# Patient Record
Sex: FEMALE | Race: WHITE
[De-identification: names, ages, dates, MRNs, and addresses within clinical notes are randomized per-mention and may not be internally consistent; named-entity substitution may affect disease eponyms.]

---

## 2021-10-12 ENCOUNTER — HOSPITAL ENCOUNTER (EMERGENCY)
Dept: HOSPITAL 79 - ER1 | Age: 31
Discharge: HOME | End: 2021-10-12
Payer: COMMERCIAL

## 2021-10-12 DIAGNOSIS — Z90.49: ICD-10-CM

## 2021-10-12 DIAGNOSIS — Z88.0: ICD-10-CM

## 2021-10-12 DIAGNOSIS — N39.0: Primary | ICD-10-CM

## 2021-10-12 LAB
BUN/CREATININE RATIO: 14 (ref 0–10)
HGB BLD-MCNC: 15.3 GM/DL (ref 12.3–15.3)
RED BLOOD COUNT: 5.19 M/UL (ref 4–5.1)
WHITE BLOOD COUNT: 8.9 K/UL (ref 4.5–11)

## 2021-10-29 ENCOUNTER — HOSPITAL ENCOUNTER (EMERGENCY)
Dept: HOSPITAL 79 - ER1 | Age: 31
Discharge: LEFT BEFORE DISCHARGE | End: 2021-10-29
Payer: COMMERCIAL

## 2021-10-29 DIAGNOSIS — Z53.21: Primary | ICD-10-CM

## 2021-10-29 LAB
BUN/CREATININE RATIO: 22 (ref 0–10)
HGB BLD-MCNC: 14.4 GM/DL (ref 12.3–15.3)
RED BLOOD COUNT: 4.86 M/UL (ref 4–5.1)
WHITE BLOOD COUNT: 10.9 K/UL (ref 4.5–11)

## 2021-12-04 ENCOUNTER — HOSPITAL ENCOUNTER (EMERGENCY)
Dept: HOSPITAL 79 - ER1 | Age: 31
Discharge: HOME | End: 2021-12-04
Payer: COMMERCIAL

## 2021-12-04 DIAGNOSIS — I88.0: Primary | ICD-10-CM

## 2021-12-04 LAB
BUN/CREATININE RATIO: 13 (ref 0–10)
HGB BLD-MCNC: 14.1 GM/DL (ref 12.3–15.3)
RED BLOOD COUNT: 4.83 M/UL (ref 4–5.1)
WHITE BLOOD COUNT: 5.7 K/UL (ref 4.5–11)

## 2021-12-06 ENCOUNTER — HOSPITAL ENCOUNTER (EMERGENCY)
Dept: HOSPITAL 79 - ER1 | Age: 31
Discharge: HOME | End: 2021-12-06
Payer: COMMERCIAL

## 2021-12-06 DIAGNOSIS — B08.4: Primary | ICD-10-CM

## 2022-01-13 ENCOUNTER — HOSPITAL ENCOUNTER (EMERGENCY)
Dept: HOSPITAL 79 - ER1 | Age: 32
Discharge: HOME | End: 2022-01-13
Payer: COMMERCIAL

## 2022-01-13 DIAGNOSIS — R07.89: Primary | ICD-10-CM

## 2022-01-13 DIAGNOSIS — F17.200: ICD-10-CM

## 2022-01-13 DIAGNOSIS — Z88.0: ICD-10-CM

## 2022-01-13 DIAGNOSIS — Z88.8: ICD-10-CM

## 2022-01-13 LAB
BUN/CREATININE RATIO: 19 (ref 0–10)
HGB BLD-MCNC: 13 GM/DL (ref 12.3–15.3)
RED BLOOD COUNT: 4.55 M/UL (ref 4–5.1)
WHITE BLOOD COUNT: 7.1 K/UL (ref 4.5–11)

## 2022-01-23 ENCOUNTER — HOSPITAL ENCOUNTER (EMERGENCY)
Dept: HOSPITAL 79 - ER1 | Age: 32
Discharge: HOME | End: 2022-01-23
Payer: COMMERCIAL

## 2022-01-23 DIAGNOSIS — F17.200: ICD-10-CM

## 2022-01-23 DIAGNOSIS — U07.1: Primary | ICD-10-CM

## 2022-01-23 PROCEDURE — U0003 INFECTIOUS AGENT DETECTION BY NUCLEIC ACID (DNA OR RNA); SEVERE ACUTE RESPIRATORY SYNDROME CORONAVIRUS 2 (SARS-COV-2) (CORONAVIRUS DISEASE [COVID-19]), AMPLIFIED PROBE TECHNIQUE, MAKING USE OF HIGH THROUGHPUT TECHNOLOGIES AS DESCRIBED BY CMS-2020-01-R: HCPCS

## 2022-03-01 ENCOUNTER — HOSPITAL ENCOUNTER (EMERGENCY)
Dept: HOSPITAL 79 - ER1 | Age: 32
Discharge: HOME | End: 2022-03-01
Payer: COMMERCIAL

## 2022-03-01 DIAGNOSIS — F17.290: ICD-10-CM

## 2022-03-01 DIAGNOSIS — S20.212A: ICD-10-CM
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
## 2022-03-01 DIAGNOSIS — Z90.710: ICD-10-CM

## 2022-03-01 DIAGNOSIS — S90.32XA: Primary | ICD-10-CM

## 2022-03-08 ENCOUNTER — HOSPITAL ENCOUNTER (EMERGENCY)
Dept: HOSPITAL 79 - ER1 | Age: 32
Discharge: HOME | End: 2022-03-08
Payer: COMMERCIAL

## 2022-03-08 DIAGNOSIS — F17.210: ICD-10-CM

## 2022-03-08 DIAGNOSIS — Z90.710: ICD-10-CM

## 2022-03-08 DIAGNOSIS — J02.0: Primary | ICD-10-CM

## 2022-03-08 DIAGNOSIS — Z88.0: ICD-10-CM

## 2022-03-08 LAB
BUN/CREATININE RATIO: 18 (ref 0–10)
HGB BLD-MCNC: 13.9 GM/DL (ref 12.3–15.3)
RED BLOOD COUNT: 5 M/UL (ref 4–5.1)
WHITE BLOOD COUNT: 9.5 K/UL (ref 4.5–11)

## 2022-03-10 ENCOUNTER — HOSPITAL ENCOUNTER (EMERGENCY)
Dept: HOSPITAL 79 - ER1 | Age: 32
Discharge: HOME | End: 2022-03-10
Payer: COMMERCIAL

## 2022-03-10 DIAGNOSIS — R10.31: Primary | ICD-10-CM

## 2022-03-10 LAB
BUN/CREATININE RATIO: 17 (ref 0–10)
HGB BLD-MCNC: 12.9 GM/DL (ref 12.3–15.3)
RED BLOOD COUNT: 4.72 M/UL (ref 4–5.1)
WHITE BLOOD COUNT: 5.5 K/UL (ref 4.5–11)

## 2022-03-11 ENCOUNTER — APPOINTMENT (OUTPATIENT)
Dept: GENERAL RADIOLOGY | Facility: HOSPITAL | Age: 32
End: 2022-03-11

## 2022-03-11 ENCOUNTER — HOSPITAL ENCOUNTER (EMERGENCY)
Facility: HOSPITAL | Age: 32
Discharge: HOME | End: 2022-03-12
Attending: EMERGENCY MEDICINE | Admitting: EMERGENCY MEDICINE

## 2022-03-11 DIAGNOSIS — R11.10 VOMITING, INTRACTABILITY OF VOMITING NOT SPECIFIED, PRESENCE OF NAUSEA NOT SPECIFIED, UNSPECIFIED VOMITING TYPE: Primary | ICD-10-CM

## 2022-03-11 DIAGNOSIS — R10.84 GENERALIZED ABDOMINAL PAIN: ICD-10-CM

## 2022-03-11 DIAGNOSIS — M54.50 ACUTE LOW BACK PAIN WITHOUT SCIATICA, UNSPECIFIED BACK PAIN LATERALITY: ICD-10-CM

## 2022-03-11 LAB
ALBUMIN SERPL-MCNC: 4.04 G/DL (ref 3.5–5.2)
ALBUMIN/GLOB SERPL: 1.2 G/DL
ALP SERPL-CCNC: 120 U/L (ref 39–117)
ALT SERPL W P-5'-P-CCNC: 87 U/L (ref 1–33)
ANION GAP SERPL CALCULATED.3IONS-SCNC: 12.8 MMOL/L (ref 5–15)
AST SERPL-CCNC: 75 U/L (ref 1–32)
BASOPHILS # BLD AUTO: 0.03 10*3/MM3 (ref 0–0.2)
BASOPHILS NFR BLD AUTO: 0.8 % (ref 0–1.5)
BILIRUB SERPL-MCNC: 0.2 MG/DL (ref 0–1.2)
BILIRUB UR QL STRIP: NEGATIVE
BUN SERPL-MCNC: 11 MG/DL (ref 6–20)
BUN/CREAT SERPL: 16.9 (ref 7–25)
CALCIUM SPEC-SCNC: 9.5 MG/DL (ref 8.6–10.5)
CHLORIDE SERPL-SCNC: 103 MMOL/L (ref 98–107)
CLARITY UR: CLEAR
CO2 SERPL-SCNC: 21.2 MMOL/L (ref 22–29)
COLOR UR: YELLOW
CREAT SERPL-MCNC: 0.65 MG/DL (ref 0.57–1)
CRP SERPL-MCNC: 2.64 MG/DL (ref 0–0.5)
DEPRECATED RDW RBC AUTO: 45.1 FL (ref 37–54)
EGFRCR SERPLBLD CKD-EPI 2021: 121.6 ML/MIN/1.73
EOSINOPHIL # BLD AUTO: 0.11 10*3/MM3 (ref 0–0.4)
EOSINOPHIL NFR BLD AUTO: 2.8 % (ref 0.3–6.2)
ERYTHROCYTE [DISTWIDTH] IN BLOOD BY AUTOMATED COUNT: 13.9 % (ref 12.3–15.4)
ERYTHROCYTE [SEDIMENTATION RATE] IN BLOOD: 40 MM/HR (ref 0–20)
FLUAV RNA RESP QL NAA+PROBE: NOT DETECTED
FLUBV RNA RESP QL NAA+PROBE: NOT DETECTED
GLOBULIN UR ELPH-MCNC: 3.3 GM/DL
GLUCOSE SERPL-MCNC: 90 MG/DL (ref 65–99)
GLUCOSE UR STRIP-MCNC: NEGATIVE MG/DL
HCT VFR BLD AUTO: 42.9 % (ref 34–46.6)
HGB BLD-MCNC: 13.2 G/DL (ref 12–15.9)
HGB UR QL STRIP.AUTO: NEGATIVE
HOLD SPECIMEN: NORMAL
HOLD SPECIMEN: NORMAL
IMM GRANULOCYTES # BLD AUTO: 0.02 10*3/MM3 (ref 0–0.05)
IMM GRANULOCYTES NFR BLD AUTO: 0.5 % (ref 0–0.5)
KETONES UR QL STRIP: ABNORMAL
LEUKOCYTE ESTERASE UR QL STRIP.AUTO: NEGATIVE
LIPASE SERPL-CCNC: 18 U/L (ref 13–60)
LYMPHOCYTES # BLD AUTO: 1.6 10*3/MM3 (ref 0.7–3.1)
LYMPHOCYTES NFR BLD AUTO: 40.5 % (ref 19.6–45.3)
MCH RBC QN AUTO: 27.2 PG (ref 26.6–33)
MCHC RBC AUTO-ENTMCNC: 30.8 G/DL (ref 31.5–35.7)
MCV RBC AUTO: 88.3 FL (ref 79–97)
MONOCYTES # BLD AUTO: 0.43 10*3/MM3 (ref 0.1–0.9)
MONOCYTES NFR BLD AUTO: 10.9 % (ref 5–12)
NEUTROPHILS NFR BLD AUTO: 1.76 10*3/MM3 (ref 1.7–7)
NEUTROPHILS NFR BLD AUTO: 44.5 % (ref 42.7–76)
NITRITE UR QL STRIP: NEGATIVE
NRBC BLD AUTO-RTO: 0 /100 WBC (ref 0–0.2)
PH UR STRIP.AUTO: 6 [PH] (ref 5–8)
PLATELET # BLD AUTO: 297 10*3/MM3 (ref 140–450)
PMV BLD AUTO: 9.7 FL (ref 6–12)
POTASSIUM SERPL-SCNC: 4 MMOL/L (ref 3.5–5.2)
PROT SERPL-MCNC: 7.3 G/DL (ref 6–8.5)
PROT UR QL STRIP: NEGATIVE
RBC # BLD AUTO: 4.86 10*6/MM3 (ref 3.77–5.28)
S PYO AG THROAT QL: NEGATIVE
SARS-COV-2 RNA RESP QL NAA+PROBE: NOT DETECTED
SODIUM SERPL-SCNC: 137 MMOL/L (ref 136–145)
SP GR UR STRIP: 1.03 (ref 1–1.03)
UROBILINOGEN UR QL STRIP: ABNORMAL
WBC NRBC COR # BLD: 3.95 10*3/MM3 (ref 3.4–10.8)
WHOLE BLOOD HOLD SPECIMEN: NORMAL
WHOLE BLOOD HOLD SPECIMEN: NORMAL

## 2022-03-11 PROCEDURE — 86140 C-REACTIVE PROTEIN: CPT | Performed by: PHYSICIAN ASSISTANT

## 2022-03-11 PROCEDURE — 87040 BLOOD CULTURE FOR BACTERIA: CPT | Performed by: PHYSICIAN ASSISTANT

## 2022-03-11 PROCEDURE — 87880 STREP A ASSAY W/OPTIC: CPT | Performed by: PHYSICIAN ASSISTANT

## 2022-03-11 PROCEDURE — 25010000002 ONDANSETRON PER 1 MG: Performed by: PHYSICIAN ASSISTANT

## 2022-03-11 PROCEDURE — 87081 CULTURE SCREEN ONLY: CPT | Performed by: PHYSICIAN ASSISTANT

## 2022-03-11 PROCEDURE — 87636 SARSCOV2 & INF A&B AMP PRB: CPT | Performed by: PHYSICIAN ASSISTANT

## 2022-03-11 PROCEDURE — 83690 ASSAY OF LIPASE: CPT | Performed by: PHYSICIAN ASSISTANT

## 2022-03-11 PROCEDURE — 96374 THER/PROPH/DIAG INJ IV PUSH: CPT

## 2022-03-11 PROCEDURE — 36415 COLL VENOUS BLD VENIPUNCTURE: CPT

## 2022-03-11 PROCEDURE — 99283 EMERGENCY DEPT VISIT LOW MDM: CPT

## 2022-03-11 PROCEDURE — 80053 COMPREHEN METABOLIC PANEL: CPT | Performed by: PHYSICIAN ASSISTANT

## 2022-03-11 PROCEDURE — 81003 URINALYSIS AUTO W/O SCOPE: CPT | Performed by: PHYSICIAN ASSISTANT

## 2022-03-11 PROCEDURE — 74022 RADEX COMPL AQT ABD SERIES: CPT

## 2022-03-11 PROCEDURE — 85025 COMPLETE CBC W/AUTO DIFF WBC: CPT | Performed by: PHYSICIAN ASSISTANT

## 2022-03-11 PROCEDURE — 85652 RBC SED RATE AUTOMATED: CPT | Performed by: PHYSICIAN ASSISTANT

## 2022-03-11 RX ORDER — ONDANSETRON 2 MG/ML
4 INJECTION INTRAMUSCULAR; INTRAVENOUS ONCE
Status: COMPLETED | OUTPATIENT
Start: 2022-03-11 | End: 2022-03-11

## 2022-03-11 RX ORDER — MORPHINE SULFATE 2 MG/ML
2 INJECTION, SOLUTION INTRAMUSCULAR; INTRAVENOUS ONCE
Status: COMPLETED | OUTPATIENT
Start: 2022-03-11 | End: 2022-03-12

## 2022-03-11 RX ORDER — SODIUM CHLORIDE 0.9 % (FLUSH) 0.9 %
10 SYRINGE (ML) INJECTION AS NEEDED
Status: DISCONTINUED | OUTPATIENT
Start: 2022-03-11 | End: 2022-03-12 | Stop reason: HOSPADM

## 2022-03-11 RX ADMIN — ONDANSETRON 4 MG: 2 INJECTION INTRAMUSCULAR; INTRAVENOUS at 21:19

## 2022-03-11 RX ADMIN — SODIUM CHLORIDE 1000 ML: 9 INJECTION, SOLUTION INTRAVENOUS at 21:19

## 2022-03-11 NOTE — ED NOTES
MEDICAL SCREENING:    Reason for Visit: Nausea, vomiting, and diarrhea     Patient initially seen in triage.  The patient was advised further evaluation and diagnostic testing will be needed, some of the treatment and testing will be initiated in the lobby in order to begin the process.  The patient will be returned to the waiting area for the time being and possibly be re-assessed by a subsequent ED provider.  The patient will be brought back to the treatment area in as timely manner as possible.         Dasha Martin PA-C  03/11/22 4434

## 2022-03-12 ENCOUNTER — APPOINTMENT (OUTPATIENT)
Dept: CT IMAGING | Facility: HOSPITAL | Age: 32
End: 2022-03-12

## 2022-03-12 VITALS
WEIGHT: 245 LBS | SYSTOLIC BLOOD PRESSURE: 108 MMHG | OXYGEN SATURATION: 98 % | HEART RATE: 88 BPM | HEIGHT: 65 IN | TEMPERATURE: 98.1 F | RESPIRATION RATE: 18 BRPM | BODY MASS INDEX: 40.82 KG/M2 | DIASTOLIC BLOOD PRESSURE: 62 MMHG

## 2022-03-12 PROCEDURE — 96375 TX/PRO/DX INJ NEW DRUG ADDON: CPT

## 2022-03-12 PROCEDURE — 74177 CT ABD & PELVIS W/CONTRAST: CPT

## 2022-03-12 PROCEDURE — 25010000002 IOPAMIDOL 61 % SOLUTION: Performed by: EMERGENCY MEDICINE

## 2022-03-12 PROCEDURE — 72131 CT LUMBAR SPINE W/O DYE: CPT

## 2022-03-12 PROCEDURE — 25010000002 MORPHINE PER 10 MG: Performed by: NURSE PRACTITIONER

## 2022-03-12 RX ADMIN — IOPAMIDOL 90 ML: 612 INJECTION, SOLUTION INTRAVENOUS at 00:14

## 2022-03-12 RX ADMIN — MORPHINE SULFATE 2 MG: 2 INJECTION, SOLUTION INTRAMUSCULAR; INTRAVENOUS at 00:06

## 2022-03-12 NOTE — ED PROVIDER NOTES
Subjective   Pt c/o not being able to keep anything down x 2 days, now having pain in her arms and legs. Takes estrogen following hysterectomy 02/2022.       History provided by:  Patient   used: No    Vomiting  The primary symptoms include nausea and vomiting. Primary symptoms do not include fever, diarrhea, dysuria or rash. The illness began 2 days ago. The onset was sudden.   The illness is also significant for chills. Associated medical issues comments: recent hysterectomy.       Review of Systems   Constitutional: Positive for chills. Negative for fever.   HENT: Negative for congestion, ear pain and sore throat.    Respiratory: Negative for cough, shortness of breath and wheezing.    Cardiovascular: Negative for chest pain.   Gastrointestinal: Positive for nausea and vomiting. Negative for diarrhea.   Genitourinary: Negative for dysuria and flank pain.   Skin: Negative for rash.   Neurological: Negative for headaches.   Psychiatric/Behavioral: The patient is not nervous/anxious.    All other systems reviewed and are negative.      No past medical history on file.    Allergies   Allergen Reactions   • Penicillins Hives and Headache       No past surgical history on file.    No family history on file.    Social History     Socioeconomic History   • Marital status:            Objective   Physical Exam  Vitals and nursing note reviewed.   Constitutional:       Appearance: She is well-developed.   HENT:      Head: Normocephalic.   Cardiovascular:      Rate and Rhythm: Normal rate and regular rhythm.   Pulmonary:      Effort: Pulmonary effort is normal.      Breath sounds: Normal breath sounds.   Abdominal:      General: Bowel sounds are normal.      Palpations: Abdomen is soft.      Tenderness: There is no abdominal tenderness.   Musculoskeletal:         General: Normal range of motion.      Cervical back: Neck supple.   Skin:     General: Skin is warm and dry.   Neurological:      Mental  Status: She is alert and oriented to person, place, and time.   Psychiatric:         Behavior: Behavior normal.         Thought Content: Thought content normal.         Judgment: Judgment normal.         Procedures           ED Course  ED Course as of 03/20/22 1219   Fri Mar 11, 2022   2157 Report given to fatemeh  [LC]   2325 XR Abdomen 2+ VW with Chest 1 VW  IMPRESSION:  No acute findings. Nonobstructive bowel gas pattern. [MB]   Sat Mar 12, 2022   0028 CT Abdomen Pelvis With Contrast    IMPRESSION:  Prior hysterectomy and cholecystectomy     Mild fatty change of the liver     Normal appendix     Otherwise normal    [MB]   0028 CT Lumbar Spine Without Contrast  IMPRESSION:  Normal CT lumbar spine [MB]      ED Course User Index  [LC] Joselin Peoples PA  [MB] Fatemeh Irwin APRN                                                 [facility]    Final diagnoses:   Vomiting, intractability of vomiting not specified, presence of nausea not specified, unspecified vomiting type   Generalized abdominal pain   Acute low back pain without sciatica, unspecified back pain laterality       ED Disposition  ED Disposition     ED Disposition   Discharge    Condition   Stable    Comment   --             Provider, No Known  [facility]  [place] [address]  [phone]    In 2 days      [facility] - [place]  See Provider List  [place] [address]  [phone]  Call in 2 days           Medication List      No changes were made to your prescriptions during this visit.          Joselin Peoples PA  03/20/22 1212

## 2022-03-13 LAB — BACTERIA SPEC AEROBE CULT: NORMAL

## 2022-03-15 NOTE — ED PROVIDER NOTES
Subjective     History provided by:  Patient   used: No    Vomiting  The primary symptoms include nausea and vomiting. Primary symptoms do not include fever, diarrhea, dysuria or rash. The illness began today. The onset was sudden.   The illness does not include chills.       Review of Systems   Constitutional: Positive for activity change and appetite change. Negative for chills and fever.   HENT: Negative for congestion, ear pain and sore throat.    Respiratory: Negative for cough, shortness of breath and wheezing.    Cardiovascular: Negative for chest pain.   Gastrointestinal: Positive for nausea and vomiting. Negative for diarrhea.   Genitourinary: Negative for dysuria and flank pain.   Skin: Negative for rash.   Neurological: Negative for headaches.   Psychiatric/Behavioral: The patient is not nervous/anxious.    All other systems reviewed and are negative.      No past medical history on file.    Allergies   Allergen Reactions   • Penicillins Hives and Headache       No past surgical history on file.    No family history on file.    Social History     Socioeconomic History   • Marital status:            Objective   Physical Exam  Vitals and nursing note reviewed.   Constitutional:       Appearance: She is well-developed.   HENT:      Head: Normocephalic.   Cardiovascular:      Rate and Rhythm: Normal rate and regular rhythm.   Pulmonary:      Effort: Pulmonary effort is normal.      Breath sounds: Normal breath sounds.   Abdominal:      General: Bowel sounds are normal.      Palpations: Abdomen is soft.      Tenderness: There is no abdominal tenderness.   Musculoskeletal:         General: Normal range of motion.      Cervical back: Neck supple.   Skin:     General: Skin is warm and dry.   Neurological:      Mental Status: She is alert and oriented to person, place, and time.   Psychiatric:         Behavior: Behavior normal.         Thought Content: Thought content normal.          Judgment: Judgment normal.         Procedures           ED Course  ED Course as of 03/15/22 1026   Fri Mar 11, 2022   2157 Report given to fatemeh  [LC]   2325 XR Abdomen 2+ VW with Chest 1 VW  IMPRESSION:  No acute findings. Nonobstructive bowel gas pattern. [MB]   Sat Mar 12, 2022   0028 CT Abdomen Pelvis With Contrast    IMPRESSION:  Prior hysterectomy and cholecystectomy     Mild fatty change of the liver     Normal appendix     Otherwise normal    [MB]   0028 CT Lumbar Spine Without Contrast  IMPRESSION:  Normal CT lumbar spine [MB]      ED Course User Index  [LC] Joselin Peoples PA  [MB] Fatemeh Irwin, APRN                                                 [facility]    Final diagnoses:   Vomiting, intractability of vomiting not specified, presence of nausea not specified, unspecified vomiting type   Generalized abdominal pain   Acute low back pain without sciatica, unspecified back pain laterality       ED Disposition  ED Disposition     ED Disposition   Discharge    Condition   Stable    Comment   --             Provider, No Known  [facility] [place] [address]  [phone]    In 2 days      PATIENT CONNECTION - [place]  See Provider List  [place] [address]  [phone]  Call in 2 days           Medication List      No changes were made to your prescriptions during this visit.          Joselin Peoples PA  03/15/22 1026

## 2022-03-16 LAB — BACTERIA SPEC AEROBE CULT: NORMAL

## 2022-04-04 ENCOUNTER — HOSPITAL ENCOUNTER (EMERGENCY)
Dept: HOSPITAL 79 - ER1 | Age: 32
LOS: 1 days | Discharge: HOME | End: 2022-04-05
Payer: COMMERCIAL

## 2022-04-04 DIAGNOSIS — F17.290: ICD-10-CM

## 2022-04-04 DIAGNOSIS — Z88.0: ICD-10-CM

## 2022-04-04 DIAGNOSIS — Z91.040: ICD-10-CM

## 2022-04-04 DIAGNOSIS — K76.0: Primary | ICD-10-CM

## 2022-04-04 LAB
BUN/CREATININE RATIO: 18 (ref 0–10)
HGB BLD-MCNC: 13.1 GM/DL (ref 12.3–15.3)
RED BLOOD COUNT: 4.81 M/UL (ref 4–5.1)
WHITE BLOOD COUNT: 7.5 K/UL (ref 4.5–11)

## 2022-05-06 ENCOUNTER — HOSPITAL ENCOUNTER (EMERGENCY)
Dept: HOSPITAL 79 - ER1 | Age: 32
Discharge: HOME | End: 2022-05-06
Payer: COMMERCIAL

## 2022-05-06 DIAGNOSIS — K76.0: Primary | ICD-10-CM

## 2022-05-06 DIAGNOSIS — F17.200: ICD-10-CM

## 2022-05-06 LAB
BUN/CREATININE RATIO: 14 (ref 0–10)
HGB BLD-MCNC: 13.3 GM/DL (ref 12.3–15.3)
RED BLOOD COUNT: 4.84 M/UL (ref 4–5.1)
WHITE BLOOD COUNT: 6.8 K/UL (ref 4.5–11)

## 2022-05-10 ENCOUNTER — HOSPITAL ENCOUNTER (EMERGENCY)
Dept: HOSPITAL 79 - ER1 | Age: 32
Discharge: HOME | End: 2022-05-10
Payer: COMMERCIAL

## 2022-05-10 DIAGNOSIS — T14.8XXA: Primary | ICD-10-CM
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
## 2022-05-10 DIAGNOSIS — F17.290: ICD-10-CM

## 2022-07-16 ENCOUNTER — HOSPITAL ENCOUNTER (EMERGENCY)
Dept: HOSPITAL 79 - ER1 | Age: 32
Discharge: HOME | End: 2022-07-16
Payer: COMMERCIAL

## 2022-07-16 DIAGNOSIS — R10.9: Primary | ICD-10-CM

## 2022-07-16 DIAGNOSIS — Z90.49: ICD-10-CM

## 2022-07-16 DIAGNOSIS — F17.210: ICD-10-CM

## 2022-07-16 DIAGNOSIS — Z88.0: ICD-10-CM

## 2022-07-16 DIAGNOSIS — R10.811: ICD-10-CM

## 2022-07-16 DIAGNOSIS — Z90.710: ICD-10-CM

## 2022-07-16 LAB
BUN/CREATININE RATIO: 17 (ref 0–10)
HGB BLD-MCNC: 13.8 GM/DL (ref 12.3–15.3)
RED BLOOD COUNT: 4.83 M/UL (ref 4–5.1)
WHITE BLOOD COUNT: 8.4 K/UL (ref 4.5–11)

## 2022-07-24 ENCOUNTER — HOSPITAL ENCOUNTER (EMERGENCY)
Dept: HOSPITAL 79 - ER1 | Age: 32
Discharge: HOME | End: 2022-07-24
Payer: COMMERCIAL

## 2022-07-24 DIAGNOSIS — Z88.0: ICD-10-CM

## 2022-07-24 DIAGNOSIS — K76.0: Primary | ICD-10-CM

## 2022-07-24 DIAGNOSIS — F17.210: ICD-10-CM

## 2022-07-24 LAB
BUN/CREATININE RATIO: 18 (ref 0–10)
HGB BLD-MCNC: 13.6 GM/DL (ref 12.3–15.3)
RED BLOOD COUNT: 4.75 M/UL (ref 4–5.1)
WHITE BLOOD COUNT: 8.4 K/UL (ref 4.5–11)

## 2022-07-24 PROCEDURE — C9113 INJ PANTOPRAZOLE SODIUM, VIA: HCPCS

## 2022-09-02 ENCOUNTER — HOSPITAL ENCOUNTER (EMERGENCY)
Dept: HOSPITAL 79 - ER1 | Age: 32
Discharge: HOME | End: 2022-09-02
Payer: COMMERCIAL

## 2022-09-02 DIAGNOSIS — Z90.710: ICD-10-CM

## 2022-09-02 DIAGNOSIS — Z88.1: ICD-10-CM

## 2022-09-02 DIAGNOSIS — N39.0: Primary | ICD-10-CM

## 2022-09-02 DIAGNOSIS — Z88.0: ICD-10-CM

## 2022-09-02 DIAGNOSIS — Z90.49: ICD-10-CM

## 2022-09-02 DIAGNOSIS — F17.200: ICD-10-CM

## 2022-09-02 LAB
BUN/CREATININE RATIO: 14 (ref 0–10)
HGB BLD-MCNC: 13.3 GM/DL (ref 12.3–15.3)
RED BLOOD COUNT: 4.65 M/UL (ref 4–5.1)
WHITE BLOOD COUNT: 6.8 K/UL (ref 4.5–11)

## 2022-09-04 ENCOUNTER — HOSPITAL ENCOUNTER (EMERGENCY)
Dept: HOSPITAL 79 - ER1 | Age: 32
Discharge: HOME | End: 2022-09-04
Payer: COMMERCIAL

## 2022-09-04 DIAGNOSIS — Z90.710: ICD-10-CM

## 2022-09-04 DIAGNOSIS — F17.210: ICD-10-CM

## 2022-09-04 DIAGNOSIS — R30.0: Primary | ICD-10-CM

## 2022-09-04 DIAGNOSIS — M54.50: ICD-10-CM

## 2022-09-04 DIAGNOSIS — Z88.0: ICD-10-CM

## 2022-10-17 ENCOUNTER — TRANSCRIBE ORDERS (OUTPATIENT)
Dept: ADMINISTRATIVE | Facility: HOSPITAL | Age: 32
End: 2022-10-17

## 2022-10-17 DIAGNOSIS — R10.9 STOMACH ACHE: Primary | ICD-10-CM

## 2022-10-26 ENCOUNTER — TRANSCRIBE ORDERS (OUTPATIENT)
Dept: ADMINISTRATIVE | Facility: HOSPITAL | Age: 32
End: 2022-10-26

## 2022-10-26 DIAGNOSIS — R79.89 ELEVATED LFTS: Primary | ICD-10-CM

## 2022-11-01 ENCOUNTER — HOSPITAL ENCOUNTER (OUTPATIENT)
Dept: ULTRASOUND IMAGING | Facility: HOSPITAL | Age: 32
Discharge: HOME | End: 2022-11-01
Admitting: STUDENT IN AN ORGANIZED HEALTH CARE EDUCATION/TRAINING PROGRAM

## 2022-11-01 DIAGNOSIS — R79.89 ELEVATED LFTS: ICD-10-CM

## 2022-11-01 PROCEDURE — 76705 ECHO EXAM OF ABDOMEN: CPT

## 2022-11-01 PROCEDURE — 76705 ECHO EXAM OF ABDOMEN: CPT | Performed by: RADIOLOGY
